# Patient Record
Sex: MALE | Race: OTHER | NOT HISPANIC OR LATINO
[De-identification: names, ages, dates, MRNs, and addresses within clinical notes are randomized per-mention and may not be internally consistent; named-entity substitution may affect disease eponyms.]

---

## 2018-12-10 ENCOUNTER — TRANSCRIPTION ENCOUNTER (OUTPATIENT)
Age: 38
End: 2018-12-10

## 2019-01-09 ENCOUNTER — TRANSCRIPTION ENCOUNTER (OUTPATIENT)
Age: 39
End: 2019-01-09

## 2019-06-11 ENCOUNTER — TRANSCRIPTION ENCOUNTER (OUTPATIENT)
Age: 39
End: 2019-06-11

## 2019-06-13 ENCOUNTER — APPOINTMENT (OUTPATIENT)
Dept: ORTHOPEDIC SURGERY | Facility: CLINIC | Age: 39
End: 2019-06-13
Payer: MEDICAID

## 2019-06-13 VITALS
BODY MASS INDEX: 27.2 KG/M2 | DIASTOLIC BLOOD PRESSURE: 74 MMHG | SYSTOLIC BLOOD PRESSURE: 132 MMHG | HEART RATE: 91 BPM | HEIGHT: 70 IN | WEIGHT: 190 LBS

## 2019-06-13 DIAGNOSIS — F17.200 NICOTINE DEPENDENCE, UNSPECIFIED, UNCOMPLICATED: ICD-10-CM

## 2019-06-13 DIAGNOSIS — Z78.9 OTHER SPECIFIED HEALTH STATUS: ICD-10-CM

## 2019-06-13 DIAGNOSIS — Z72.3 LACK OF PHYSICAL EXERCISE: ICD-10-CM

## 2019-06-13 PROCEDURE — 99203 OFFICE O/P NEW LOW 30 MIN: CPT

## 2019-06-14 PROBLEM — F17.200 CURRENT EVERY DAY SMOKER: Status: ACTIVE | Noted: 2019-06-13

## 2019-06-14 PROBLEM — Z78.9 NO PERTINENT PAST MEDICAL HISTORY: Status: RESOLVED | Noted: 2019-06-13 | Resolved: 2019-06-14

## 2019-06-14 PROBLEM — Z72.3 DOES NOT EXERCISE: Status: ACTIVE | Noted: 2019-06-13

## 2019-06-14 PROBLEM — Z78.9 DENIES ALCOHOL CONSUMPTION: Status: ACTIVE | Noted: 2019-06-13

## 2019-06-18 ENCOUNTER — EMERGENCY (EMERGENCY)
Facility: HOSPITAL | Age: 39
LOS: 0 days | Discharge: HOME | End: 2019-06-19
Attending: EMERGENCY MEDICINE | Admitting: EMERGENCY MEDICINE
Payer: MEDICAID

## 2019-06-18 VITALS — HEIGHT: 70 IN | WEIGHT: 190.04 LBS

## 2019-06-18 DIAGNOSIS — F11.10 OPIOID ABUSE, UNCOMPLICATED: ICD-10-CM

## 2019-06-18 DIAGNOSIS — R45.851 SUICIDAL IDEATIONS: ICD-10-CM

## 2019-06-18 LAB
APPEARANCE UR: CLEAR — SIGNIFICANT CHANGE UP
BILIRUB UR-MCNC: NEGATIVE — SIGNIFICANT CHANGE UP
COLOR SPEC: YELLOW — SIGNIFICANT CHANGE UP
DIFF PNL FLD: ABNORMAL
GLUCOSE UR QL: NEGATIVE MG/DL — SIGNIFICANT CHANGE UP
KETONES UR-MCNC: ABNORMAL
LEUKOCYTE ESTERASE UR-ACNC: NEGATIVE — SIGNIFICANT CHANGE UP
NITRITE UR-MCNC: NEGATIVE — SIGNIFICANT CHANGE UP
PCP SPEC-MCNC: SIGNIFICANT CHANGE UP
PH UR: 5 — SIGNIFICANT CHANGE UP (ref 5–8)
PROT UR-MCNC: 15 MG/DL
SP GR SPEC: 1.02 — SIGNIFICANT CHANGE UP (ref 1.01–1.02)
UROBILINOGEN FLD QL: NEGATIVE MG/DL — SIGNIFICANT CHANGE UP

## 2019-06-18 PROCEDURE — 99284 EMERGENCY DEPT VISIT MOD MDM: CPT

## 2019-06-18 PROCEDURE — 90792 PSYCH DIAG EVAL W/MED SRVCS: CPT | Mod: GT

## 2019-06-18 NOTE — ED ADULT NURSE NOTE - OBJECTIVE STATEMENT
Pt. brought in by parents for eval of suicidal statements, As per pt. denies SI/HI, admits to using heroin today and daily cigarettes. Pt. states he has been more withdrawn because he is told he is too loud. Pt. states he is unemployed but enjoys time with his 3 month old puppy. As per parents, pt. had hx of oxycodone abuse but never admitted it to his family. Mother showed RN picture on her phone of pt. today when she got home, picture showed pt. in a disorganized room where pt. was laying on ground, supine with head under nightstand with shoes on and sweatshirt pulled over his head. Pt. father states pt. has been more lethargic since after father's day and has been forgetful. When pt. is approached by parents about lethargy he states "I'm just tired." Pt. brought in by parents for eval of suicidal statements, As per pt. denies SI/HI, admits to using heroin today and daily cigarettes. Pt. states he has been more withdrawn because he is told he is too loud. Pt. states he is unemployed but enjoys time with his 3 month old puppy. As per parents, pt. had hx of oxycodone abuse but never admitted it to his family. Mother showed RN picture on her phone of pt. today when she got home, picture showed pt. in a disorganized room where pt. was laying on ground, supine with head under nightstand with shoes on and sweatshirt pulled over his head. Pt. father states pt. has been more lethargic since after father's day and has been forgetful. When pt. is approached by parents about lethargy he states "I'm just tired." As per father pt. stated today "I just want everything to end and I want to die." Pt. denies SI/Hi to nurse, safety maintained, constant observation intact.

## 2019-06-18 NOTE — ED ADULT TRIAGE NOTE - CHIEF COMPLAINT QUOTE
Pts' father reports pt has been depressed and " out of it" for the past 2 days with little interest in activities . Father reports pt stated he is "looking forward to the end" today which prompted this ER visit. Pt admits to being depressed for the several days, denies SI/HI.

## 2019-06-19 VITALS
RESPIRATION RATE: 18 BRPM | OXYGEN SATURATION: 98 % | DIASTOLIC BLOOD PRESSURE: 92 MMHG | TEMPERATURE: 98 F | SYSTOLIC BLOOD PRESSURE: 121 MMHG | HEART RATE: 91 BPM

## 2019-06-19 DIAGNOSIS — F11.10 OPIOID ABUSE, UNCOMPLICATED: ICD-10-CM

## 2019-06-19 LAB
ALBUMIN SERPL ELPH-MCNC: 3.5 G/DL — SIGNIFICANT CHANGE UP (ref 3.3–5)
ALP SERPL-CCNC: 96 U/L — SIGNIFICANT CHANGE UP (ref 40–120)
ALT FLD-CCNC: 23 U/L — SIGNIFICANT CHANGE UP (ref 12–78)
AMPHET UR-MCNC: NEGATIVE — SIGNIFICANT CHANGE UP
ANION GAP SERPL CALC-SCNC: 8 MMOL/L — SIGNIFICANT CHANGE UP (ref 5–17)
APAP SERPL-MCNC: < 2 UG/ML (ref 10–30)
AST SERPL-CCNC: 17 U/L — SIGNIFICANT CHANGE UP (ref 15–37)
BACTERIA # UR AUTO: NEGATIVE — SIGNIFICANT CHANGE UP
BARBITURATES UR SCN-MCNC: NEGATIVE — SIGNIFICANT CHANGE UP
BASOPHILS # BLD AUTO: 0 K/UL — SIGNIFICANT CHANGE UP (ref 0–0.2)
BASOPHILS NFR BLD AUTO: 0 % — SIGNIFICANT CHANGE UP (ref 0–2)
BENZODIAZ UR-MCNC: POSITIVE — SIGNIFICANT CHANGE UP
BILIRUB SERPL-MCNC: 0.4 MG/DL — SIGNIFICANT CHANGE UP (ref 0.2–1.2)
BUN SERPL-MCNC: 21 MG/DL — SIGNIFICANT CHANGE UP (ref 7–23)
CALCIUM SERPL-MCNC: 8.8 MG/DL — SIGNIFICANT CHANGE UP (ref 8.5–10.1)
CHLORIDE SERPL-SCNC: 107 MMOL/L — SIGNIFICANT CHANGE UP (ref 96–108)
CO2 SERPL-SCNC: 26 MMOL/L — SIGNIFICANT CHANGE UP (ref 22–31)
COCAINE METAB.OTHER UR-MCNC: NEGATIVE — SIGNIFICANT CHANGE UP
CREAT SERPL-MCNC: 1.34 MG/DL — HIGH (ref 0.5–1.3)
EOSINOPHIL # BLD AUTO: 0.29 K/UL — SIGNIFICANT CHANGE UP (ref 0–0.5)
EOSINOPHIL NFR BLD AUTO: 2 % — SIGNIFICANT CHANGE UP (ref 0–6)
EPI CELLS # UR: NEGATIVE — SIGNIFICANT CHANGE UP
ETHANOL SERPL-MCNC: <10 MG/DL — SIGNIFICANT CHANGE UP (ref 0–10)
GLUCOSE SERPL-MCNC: 136 MG/DL — HIGH (ref 70–99)
HCT VFR BLD CALC: 45.2 % — SIGNIFICANT CHANGE UP (ref 39–50)
HGB BLD-MCNC: 15 G/DL — SIGNIFICANT CHANGE UP (ref 13–17)
LYMPHOCYTES # BLD AUTO: 29 % — SIGNIFICANT CHANGE UP (ref 13–44)
LYMPHOCYTES # BLD AUTO: 4.22 K/UL — HIGH (ref 1–3.3)
MANUAL SMEAR VERIFICATION: SIGNIFICANT CHANGE UP
MCHC RBC-ENTMCNC: 28.7 PG — SIGNIFICANT CHANGE UP (ref 27–34)
MCHC RBC-ENTMCNC: 33.2 GM/DL — SIGNIFICANT CHANGE UP (ref 32–36)
MCV RBC AUTO: 86.6 FL — SIGNIFICANT CHANGE UP (ref 80–100)
METHADONE UR-MCNC: NEGATIVE — SIGNIFICANT CHANGE UP
MONOCYTES # BLD AUTO: 0.29 K/UL — SIGNIFICANT CHANGE UP (ref 0–0.9)
MONOCYTES NFR BLD AUTO: 2 % — SIGNIFICANT CHANGE UP (ref 2–14)
NEUTROPHILS # BLD AUTO: 9.76 K/UL — HIGH (ref 1.8–7.4)
NEUTROPHILS NFR BLD AUTO: 67 % — SIGNIFICANT CHANGE UP (ref 43–77)
NRBC # BLD: 0 /100 — SIGNIFICANT CHANGE UP (ref 0–0)
NRBC # BLD: SIGNIFICANT CHANGE UP /100 WBCS (ref 0–0)
OPIATES UR-MCNC: POSITIVE — SIGNIFICANT CHANGE UP
PCP UR-MCNC: NEGATIVE — SIGNIFICANT CHANGE UP
PLAT MORPH BLD: NORMAL — SIGNIFICANT CHANGE UP
PLATELET # BLD AUTO: 297 K/UL — SIGNIFICANT CHANGE UP (ref 150–400)
POTASSIUM SERPL-MCNC: 4 MMOL/L — SIGNIFICANT CHANGE UP (ref 3.5–5.3)
POTASSIUM SERPL-SCNC: 4 MMOL/L — SIGNIFICANT CHANGE UP (ref 3.5–5.3)
PROT SERPL-MCNC: 6.8 GM/DL — SIGNIFICANT CHANGE UP (ref 6–8.3)
RBC # BLD: 5.22 M/UL — SIGNIFICANT CHANGE UP (ref 4.2–5.8)
RBC # FLD: 13.3 % — SIGNIFICANT CHANGE UP (ref 10.3–14.5)
RBC BLD AUTO: NORMAL — SIGNIFICANT CHANGE UP
RBC CASTS # UR COMP ASSIST: ABNORMAL /HPF (ref 0–4)
SALICYLATES SERPL-MCNC: 2.8 MG/DL — SIGNIFICANT CHANGE UP (ref 2.8–20)
SODIUM SERPL-SCNC: 141 MMOL/L — SIGNIFICANT CHANGE UP (ref 135–145)
THC UR QL: NEGATIVE — SIGNIFICANT CHANGE UP
TSH SERPL-MCNC: 1.73 UU/ML — SIGNIFICANT CHANGE UP (ref 0.34–4.82)
WBC # BLD: 14.56 K/UL — HIGH (ref 3.8–10.5)
WBC # FLD AUTO: 14.56 K/UL — HIGH (ref 3.8–10.5)
WBC UR QL: SIGNIFICANT CHANGE UP

## 2019-06-19 NOTE — ED PROVIDER NOTE - PROGRESS NOTE DETAILS
labs back etoh negative telepsych called case dw psychiatrist negro Buchanan DO spoke with telepsych pt can be d/c MINO Buchanan DO

## 2019-06-19 NOTE — ED BEHAVIORAL HEALTH ASSESSMENT NOTE - SUMMARY
Patient is a 38 year old single unemployed  male domiciled with mother with no formal psychiatric history, no past hospitalizations, no past suicide attempts, no past violence but has destroyed property a few times, past arrests for drug related issues, past substance abuse history opiate, cannabis and alcohol abuse, no known medical issues patient arrived via walk-in accompanied by parents due to suicidal statements in the context of what parents believe to be a depressive state over the past few days.   urine toxicology was positive for benzo and opiates. pt also admitted to heroin use today to nurse which he later denied to writer. pt is not disclosing his substance abuse to writer or his family. opiate and benzo use would explain behaviors reported by family- pt passed out on the floor, lethargic, confused, and withdrawn. the pt may have an underlying mood disorder as he made depressive statements and did share that he carries some resentment toward father, however the pt also said that he specifically made statements to upset his father. pt has also been unemployed for a year and parents appears to be reinforcing this behavior.     at this time the pt is not manic, depressive, overly depressed or anxious. the pt is not at imminent risk for suicide or homicide, is able to care for self, and is therefore not meeting criteria for involuntary psychiatric hospitalization. pt wants to go home. rehab is recommended however pt is declining at this time.

## 2019-06-19 NOTE — ED PROVIDER NOTE - CONSTITUTIONAL, MLM
normal... disheveled, well nourished, awake, alert, oriented to person, place, time/situation and in no apparent distress.

## 2019-06-19 NOTE — ED BEHAVIORAL HEALTH ASSESSMENT NOTE - REFERRAL / APPOINTMENT DETAILS
faxed over substance abuse referral information and dual diagnosis substance and mental health referral information

## 2019-06-19 NOTE — ED BEHAVIORAL HEALTH ASSESSMENT NOTE - DESCRIPTION (FIRST USE, LAST USE, QUANTITY, FREQUENCY, DURATION)
pt denies however parents reports a h/o use with 2yrs sobriety as per pt not currently. years ago was arrested for possession pt providing inconsistent information. was in detox years ago for oxycodone abuse. reports that he used heroin today but then denied. pt denies but has +urine tox

## 2019-06-19 NOTE — ED PROVIDER NOTE - OBJECTIVE STATEMENT
39 yo male bib father after making a comment that today was the end. Pt admits he ahs been in a "bad mood" for the last 2 days but denies si or hi. Pt has a ho opiate addiction 6 years ago, has "low self esteem" as per father, lives at home and is unable to work.

## 2019-06-19 NOTE — ED BEHAVIORAL HEALTH ASSESSMENT NOTE - SUICIDE PROTECTIVE FACTORS
Responsibility to family and others/Identifies reasons for living/Future oriented/Fear of death or dying due to pain/suffering

## 2019-06-19 NOTE — ED ADULT NURSE REASSESSMENT NOTE - NS ED NURSE REASSESS COMMENT FT1
Pt evaluated by Telepsych. pt comfortable at present, no agitation noted. Denies making suicidal ideations

## 2019-06-19 NOTE — ED BEHAVIORAL HEALTH ASSESSMENT NOTE - OTHER PAST PSYCHIATRIC HISTORY (INCLUDE DETAILS REGARDING ONSET, COURSE OF ILLNESS, INPATIENT/OUTPATIENT TREATMENT)
saw a psychiatrist briefly at a young age, ? trial of ritalin or zoloft, neither pt or father recall details. pt said that they suddenly stopped going to appointments. no h/o psychiatric admissions, SA and not in outpatient tx

## 2019-06-19 NOTE — ED BEHAVIORAL HEALTH ASSESSMENT NOTE - HPI (INCLUDE ILLNESS QUALITY, SEVERITY, DURATION, TIMING, CONTEXT, MODIFYING FACTORS, ASSOCIATED SIGNS AND SYMPTOMS)
Patient is a 38 year old single unemployed  male domiciled with mother with no formal psychiatric history, no past hospitalizations, no past suicide attempts, no past violence but has destroyed property a few times, past arrests for drug related issues, past substance abuse history opiate, cannabis and alcohol abuse, no known medical issues patient arrived via walk-in accompanied by parents due to suicidal statements in the context of what parents believe to be a depressive state over the past few days. urine toxicology was positive for benzo and opiates.    the pt provided inconsistent hx to writer vs. ER nurse. as per collateral (see below for more details), over the past few days the pt has been "out of it," lethargic and confused. mother showed  the ER nurse a picture of the pt passed out on the floor. pt has also been making passive suicidal statements about "looking forward to the end." pt admitted to RN that he had used heroin today, however family is unaware of current substance use he told parents in the ER that he "just tired."    the pt was pleasant during telepsych interview. he states that he does not need to be in the ER and his father is "over-reacting." he states that his father has been intermittently present in his life since 6yo. pt resents his father because father "does things with my brothers" and spends more time with them. they were all gathered for father's day and the pt reports spending a lot of time on setting up/cleaning up, no one helped him and he felt tired. he admits to making depressive statements to intentionally get under his father's skin. he denies any suicidal ideation, intent or plan. he denies any depressed mood although there were notes in the chart that he had said he was depressed. he only admits to chronically poor sleep (watches TV all night) but reports that he has enough energy during the day. appetite is good. he has not worked in over 1 year and states that he keeps busy at home maintaining the house while mother works and playing with his new puppy. 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 he denies any anxiety symptoms., he denies any HI or aggressive thoughts. denied symptoms of chucho including decreased need for sleep, increased goal directed activity, grandiosity, or racing thoughts. no psychotic symptoms of avh or paranoid delusions.     pt denied any recent substance abuse. when confronted with his statements to the RN about taking heroin today, he denied this and states that he "rarely" uses and last used a few years ago. when asked about passing out in this room he joked about his room being messy. he was confronted about his utox being positive for benzo and opiates. he asked what a benzo was and then once again denied any substance use. when asked whether he'd be interested in substance abuse referrals he said, "sure why not." of note, the pt did report a questionable hx of seeing a psychiatrist in the past an "manipulating them" and trying to get benefits.

## 2019-06-19 NOTE — ED BEHAVIORAL HEALTH ASSESSMENT NOTE - RISK ASSESSMENT
pt's h/o substance abuse and male gender as well a h/o property destruction put him at a chronically elevated risk as compared to the general population. active use, being unemployed and not in tx also contribute to acute risk factors. however the pt has no h/o psychiatric admissions, no h/o SA or sib, is denying any si/i/p, has no h/o violence toward others and is denying hi, no access to guns, is not manic or psychotic.   Acute Suicide Risk  (  ) High   (  ) Moderate   ( x ) Low   (  ) Unable to determine   Rationale ___________see above     Elevated Chronic Risk   ( x ) Yes ___________  Details ___________see above  (  ) No   ___________     Safety Plan   Details: ___________on gun in the house, given referral information, stated that he would call 911 or return to the ER for any SI or worsening mood  [x  ] Safety plan discussed with patient  [x  ] Education provided regarding environmental safety / lethal means restriction  [ x ] Provision of National Suicide Prevention Lifeline 7-255-301-TALK (1249)

## 2019-06-19 NOTE — ED BEHAVIORAL HEALTH ASSESSMENT NOTE - OTHER
no data: [facility], [facility], [facility] ED, [facility] Inpatient, [facility] CL, [facility] Outpatient Medical, [facility] Outpatient BH, H[facility] ED, [facility] Inpatient, [facility] Outpatient, [facility] Inpatient, Tier E&A, [facility] Inpatient, Quick Docs, [facility], Scan ER, [facility] Inpatient, [facility] CL, Social Media (For example - Facebook, [email or web address], Echo it), Web search, [facility], external billing deferred to medical ER

## 2019-06-19 NOTE — ED BEHAVIORAL HEALTH ASSESSMENT NOTE - DESCRIPTION
VITAL SIGNS (Last 24 hrs):  T(C): 36.8 (06-19-19 @ 01:41), Max: 37.2 (06-18-19 @ 22:32)  HR: 78 (06-19-19 @ 01:41) (78 - 100)  BP: 114/83 (06-19-19 @ 01:41) (114/83 - 135/92)  RR: 18 (06-19-19 @ 01:41) (17 - 18)  SpO2: 96% (06-19-19 @ 01:41) (96% - 100%)  Wt(kg): --  Daily Height in cm: 177.8 (18 Jun 2019 21:49)    Pre-Hospital Course: Patient arrived via walk-in accompanied by parents due to suicidal statements in the context of a depressive state over the past few days.     ED Course:  Patient arrived to the ED approximately  hours prior to consultation. Per ED RN and documentation patient arrived alert and oriented x3, however patient was noted to be lethargic. Patient is cooperative with ED medical and safety protocols. Patient denied mood symptoms, his affect is depressed, patient denied suicidal or homicidal thoughts. Patient did not report or exhibit symptoms of psychosis or chucho. Patient had linear thought process and normal speech. Patient remained in good behavioral control while in the ED, did not require PRN psychiatric medication. Patient was able to sleep while in the ED. Patient’s parents are at bedside and appear supportive.     Collateral Sources: Patient’s father Derek Simms ([phone])  HPI: Per collateral the HPI begins about 2 days ago. 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 Patient was never , no children. Patient lost his friends due to opiate abuse in past, spends his time at home helping around the house and reportedly looking for a job. Patient is described as an emotional person who can be argumentative and distant at times. Patient has no history of physical violence but can destroy property in past. Patient is in no outpatient substance or psychiatric care. Patient briefly saw a psychiatrist in his teens and may have been prescribed Ritalin but father is not sure. Patient has a history of oxycodone abuse per father, patient did a detox around 6 years ago but otherwise no other treatment, family was under the impression patient has been clean from opiates. Patient also used to abuse Etoh but reports to family sobriety for 2 years. Patient has had difficulty sleeping for several years now. No known withdrawal seizures or significant withdrawal complications.    Collateral states patient was in usual state on Father’s day but 2 days ago received a call from patient’s brother indicating patient seemed confused and incoherent standing in front of the coffee pot for a long time but patient denied any issues. Collateral received another call today that patient was in a similar state and found passed out on floor of his room, collateral states patient was slurring his words heavily and suspects patient may have been under the influence of something. Collateral reports patient got frustrated and stated that he has nothing to look forward to and is looking forward to the end of this but did not detail a plan or intent to hurt self. Collateral denies patient reported or exhibited symptoms of AH/VH or chucho. Collateral denies patient threatened others or became violent. Collateral is unsure if patient used any substances. Collateral is concerned about patient’s behavior, reports patient has not voiced SI like this in the past. Collateral states they are unsure whether this is drug related or more psychiatric, feels patient would benefit from either inpatient psychiatry or rehab for stabilization. none acute lives with his mother and brother, unemployed for the past 1 yr as a , never  and no children; has a 3mo puppy

## 2019-06-26 NOTE — HISTORY OF PRESENT ILLNESS
[6] : the relief from medicine is 6/10 [2] : the ailment interference is 2/10 [8] : the ailment interference is 8/10 [1] : the ailment interference is 1/10 [de-identified] : The patient comes in today with complaints of pain to his left shoulder.  He states he was seen, evaluated for shoulder pain and was feeling much better.  He states it had gone on for many years, but got worse over the last couple of weeks.  The patient states the onset/injury occurred on 06/10/19.  This injury is not work related or due to an automobile accident.  The patient states the pain is sharp and burning.  The patient describes the pain as constant.\par \par  [de-identified] : No use of shoulder. [de-identified] : Methylpredniisolone [] : No

## 2019-06-26 NOTE — REVIEW OF SYSTEMS
[Joint Pain] : joint pain [Arthralgia] : arthralgia [Sleep Disturbances] : ~T sleep disturbances [Muscle Weakness] : muscle weakness [Negative] : Heme/Lymph

## 2019-06-26 NOTE — DISCUSSION/SUMMARY
[de-identified] : At this time, due to resolving impingement syndrome of the left shoulder, he will finish his Medrol Dosepak and use ice and anti-inflammatory.  He will be reassessed in 3 weeks.  Should his symptoms warrant, we will discuss the potential for a subacromial injection.\par

## 2019-06-26 NOTE — ADDENDUM
[FreeTextEntry1] : This note was written by Michele Rios on 06/18/2019, acting as a scribe for Rodrigo Rhoades III, MD

## 2019-06-26 NOTE — PHYSICAL EXAM
[Normal] : Gait: normal [de-identified] : Appearance:  Well developed, well-nourished male in no acute distress.\par   [de-identified] : Right Shoulder: \par Shoulder: Range of Motion in Degrees:   	\par    	                        Claimant:          Normal:  	\par Abduction (Active)  	180  	180 degrees  	\par Abduction (Passive)  	180  	180 degrees  	\par Forward elevation (Active):  	180  	180 degrees  	\par Forward elevation (Passive):  180  	180 degrees  	\par External rotation (Active):  	45  	45 degrees  	\par External rotation (Passive):  	45  	45 degrees  	\par Internal rotation (Active):  	L-1  	L-1  	\par Internal rotation (Passive):  	L-1  	L-1  \par 	\par No motor weakness to internal rotation, external rotation or abduction in the scapular plane.  Negative crank test.  Negative O’Cy’s test.  Negative Speed’s test. Negative Yergason’s test.  Negative cross arm test.  No tenderness to palpation at the AC joint. Negative Hawkin’s sign.  Negative Neer’s sign.  Negative apprehension. Negative sulcus sign.  No gross neurological or vascular deficits distally.  Skin is intact.  No rashes, scars or lesions.  2+ radial and ulnar pulses. No extra-articular swelling or tenderness. \par \par Left Shoulder:  \par Shoulder: Range of Motion in Degrees:	\par 	                                  Claimant:	Normal:	\par Abduction (Active)	                  180	               180 degrees	\par Abduction (Passive)	  180	               180 degrees	\par Forward elevation (Active):	  180	               180 degrees	\par Forward elevation (Passive):	  180	               180 degrees	\par External rotation (Active):	   45	               45 degrees	\par External rotation (Passive):	   45	               45 degrees	\par Internal rotation (Active):	   L-1	               L-1	\par Internal rotation (Passive):	   L-1	               L-1	\par  \par No motor weakness to internal rotation, external rotation or abduction in the scapular plane.  Negative crank test.  Negative O’Cy’s test.  Negative Speed’s test. Negative Yergason’s test.  Negative cross arm test.  No tenderness to palpation at the AC joint. Positive Hawkin’s sign.  Positive Neer’s sign. Negative apprehension. Negative sulcus sign.  No gross neurological or vascular deficits distally.  Skin is intact.  No rashes, scars or lesions. 2+ radial and ulnar pulses. No extra-articular swelling or tenderness.\par

## 2019-07-09 ENCOUNTER — APPOINTMENT (OUTPATIENT)
Dept: ORTHOPEDIC SURGERY | Facility: CLINIC | Age: 39
End: 2019-07-09

## 2019-08-12 PROBLEM — Z78.9 OTHER SPECIFIED HEALTH STATUS: Chronic | Status: ACTIVE | Noted: 2019-06-19

## 2019-08-13 ENCOUNTER — TRANSCRIPTION ENCOUNTER (OUTPATIENT)
Age: 39
End: 2019-08-13

## 2019-08-22 ENCOUNTER — APPOINTMENT (OUTPATIENT)
Dept: FAMILY MEDICINE | Facility: CLINIC | Age: 39
End: 2019-08-22
Payer: MEDICAID

## 2019-08-22 VITALS
SYSTOLIC BLOOD PRESSURE: 118 MMHG | WEIGHT: 207 LBS | RESPIRATION RATE: 16 BRPM | OXYGEN SATURATION: 98 % | HEIGHT: 70 IN | HEART RATE: 90 BPM | BODY MASS INDEX: 29.63 KG/M2 | DIASTOLIC BLOOD PRESSURE: 80 MMHG

## 2019-08-22 DIAGNOSIS — R00.0 TACHYCARDIA, UNSPECIFIED: ICD-10-CM

## 2019-08-22 DIAGNOSIS — Z87.898 PERSONAL HISTORY OF OTHER SPECIFIED CONDITIONS: ICD-10-CM

## 2019-08-22 DIAGNOSIS — Z00.00 ENCOUNTER FOR GENERAL ADULT MEDICAL EXAMINATION W/OUT ABNORMAL FINDINGS: ICD-10-CM

## 2019-08-22 DIAGNOSIS — F32.9 MAJOR DEPRESSIVE DISORDER, SINGLE EPISODE, UNSPECIFIED: ICD-10-CM

## 2019-08-22 DIAGNOSIS — Z82.49 FAMILY HISTORY OF ISCHEMIC HEART DISEASE AND OTHER DISEASES OF THE CIRCULATORY SYSTEM: ICD-10-CM

## 2019-08-22 DIAGNOSIS — F11.20 OPIOID DEPENDENCE, UNCOMPLICATED: ICD-10-CM

## 2019-08-22 DIAGNOSIS — I10 ESSENTIAL (PRIMARY) HYPERTENSION: ICD-10-CM

## 2019-08-22 PROCEDURE — 99385 PREV VISIT NEW AGE 18-39: CPT | Mod: 25

## 2019-08-22 PROCEDURE — G0442 ANNUAL ALCOHOL SCREEN 15 MIN: CPT

## 2019-08-22 PROCEDURE — G0444 DEPRESSION SCREEN ANNUAL: CPT

## 2019-08-22 RX ORDER — TRAZODONE HYDROCHLORIDE 150 MG/1
150 TABLET ORAL
Refills: 0 | Status: ACTIVE | COMMUNITY

## 2019-08-22 RX ORDER — SERTRALINE HYDROCHLORIDE 50 MG/1
50 TABLET, FILM COATED ORAL
Refills: 0 | Status: ACTIVE | COMMUNITY

## 2019-08-22 RX ORDER — LABETALOL HYDROCHLORIDE 100 MG/1
100 TABLET, FILM COATED ORAL
Qty: 180 | Refills: 3 | Status: ACTIVE | COMMUNITY
Start: 1900-01-01 | End: 1900-01-01

## 2019-08-22 NOTE — HISTORY OF PRESENT ILLNESS
[FreeTextEntry1] : establish care/ annual [de-identified] : 39 yr old male is here to establish care.\par  Heroin use was in rehab in July. [facility] in [place].\par He reports he checked himself in the Psych unit was there for 15 days.\par There was told he has depression, but he does not feel like he is depressed.\par In school he was seen by Psychiatrist because he was  " had a mouth on me". Was on medication for a few days saw a second Psychiatrist who told him that he does not need medications.

## 2019-08-22 NOTE — HEALTH RISK ASSESSMENT
[Very Good] : ~his/her~  mood as very good [] : Yes [Yes] : In the past 12 months have you used drugs other than those required for medical reasons? Yes [No] : No [0] : 2) Feeling down, depressed, or hopeless: Not at all (0) [de-identified] : was taking heroine was abusing prescription drugs prior to that [Audit-CScore] : 0 [Change in mental status noted] : No change in mental status noted [Language] : denies difficulty with language [None] : None [With Family] : lives with family [Single] : single [Unemployed] : unemployed [Reports changes in dental health] : Reports no changes in dental health [Smoke Detector] : smoke detector [Carbon Monoxide Detector] : carbon monoxide detector [Seat Belt] :  uses seat belt [TB Exposure] : is not being exposed to tuberculosis [HIVDate] : 7/2019 [HepatitisCDate] : 7/2019

## 2019-08-22 NOTE — COUNSELING
[Risk of tobacco use and health benefits of smoking cessation discussed] : Risk of tobacco use and health benefits of smoking cessation discussed [Willing to Quit Smoking] : Not willing to quit smoking

## 2019-08-22 NOTE — PHYSICAL EXAM
[No Acute Distress] : no acute distress [Well Nourished] : well nourished [Well Developed] : well developed [Well-Appearing] : well-appearing [Normal Sclera/Conjunctiva] : normal sclera/conjunctiva [PERRL] : pupils equal round and reactive to light [EOMI] : extraocular movements intact [Normal Outer Ear/Nose] : the outer ears and nose were normal in appearance [Normal Oropharynx] : the oropharynx was normal [No JVD] : no jugular venous distention [No Lymphadenopathy] : no lymphadenopathy [Supple] : supple [No Respiratory Distress] : no respiratory distress  [No Accessory Muscle Use] : no accessory muscle use [Clear to Auscultation] : lungs were clear to auscultation bilaterally [Normal Rate] : normal rate  [Regular Rhythm] : with a regular rhythm [Normal S1, S2] : normal S1 and S2 [No Carotid Bruits] : no carotid bruits [No Murmur] : no murmur heard [No Abdominal Bruit] : a ~M bruit was not heard ~T in the abdomen [No Varicosities] : no varicosities [Pedal Pulses Present] : the pedal pulses are present [No Edema] : there was no peripheral edema [No Palpable Aorta] : no palpable aorta [No Extremity Clubbing/Cyanosis] : no extremity clubbing/cyanosis [Soft] : abdomen soft [Non Tender] : non-tender [Non-distended] : non-distended [No HSM] : no HSM [No Masses] : no abdominal mass palpated [Normal Bowel Sounds] : normal bowel sounds [Normal Posterior Cervical Nodes] : no posterior cervical lymphadenopathy [Normal Anterior Cervical Nodes] : no anterior cervical lymphadenopathy [No CVA Tenderness] : no CVA  tenderness [No Spinal Tenderness] : no spinal tenderness [No Joint Swelling] : no joint swelling [Grossly Normal Strength/Tone] : grossly normal strength/tone [No Rash] : no rash [Coordination Grossly Intact] : coordination grossly intact [No Focal Deficits] : no focal deficits [Normal Gait] : normal gait [Deep Tendon Reflexes (DTR)] : deep tendon reflexes were 2+ and symmetric [Normal Affect] : the affect was normal [Normal Insight/Judgement] : insight and judgment were intact

## 2019-08-22 NOTE — ASSESSMENT
[FreeTextEntry1] : Heroine addiction\par Inpatient program for 15 days \par Now outpatient 5 days a week fr 3 hours to be continued for 6 months\par Stay on Sertraline will wean off after 6 months. Patient reports he does not need it\par HTN: new diagnosis from July\par Rx renewed.\par Reports he lost weight not gained as noted on vitals. he was not weighted in june.\par Labs available from July for review, scanned in chart\par Tobacco Abuse not ready to quit.

## 2019-08-28 ENCOUNTER — TRANSCRIPTION ENCOUNTER (OUTPATIENT)
Age: 39
End: 2019-08-28

## 2019-11-01 ENCOUNTER — OUTPATIENT (OUTPATIENT)
Dept: OUTPATIENT SERVICES | Facility: HOSPITAL | Age: 39
LOS: 1 days | End: 2019-11-01
Payer: MEDICAID

## 2019-11-01 PROCEDURE — G9001: CPT

## 2019-11-05 DIAGNOSIS — Z71.89 OTHER SPECIFIED COUNSELING: ICD-10-CM

## 2020-03-11 ENCOUNTER — APPOINTMENT (OUTPATIENT)
Dept: FAMILY MEDICINE | Facility: CLINIC | Age: 40
End: 2020-03-11

## 2020-11-12 ENCOUNTER — APPOINTMENT (OUTPATIENT)
Dept: FAMILY MEDICINE | Facility: CLINIC | Age: 40
End: 2020-11-12

## 2020-11-25 ENCOUNTER — NON-APPOINTMENT (OUTPATIENT)
Age: 40
End: 2020-11-25

## 2021-03-24 ENCOUNTER — TRANSCRIPTION ENCOUNTER (OUTPATIENT)
Age: 41
End: 2021-03-24

## 2021-11-14 ENCOUNTER — INPATIENT (INPATIENT)
Facility: HOSPITAL | Age: 41
LOS: 1 days | Discharge: HOME | DRG: 263 | End: 2021-11-16
Attending: SURGERY | Admitting: SURGERY
Payer: MEDICAID

## 2021-11-14 ENCOUNTER — TRANSCRIPTION ENCOUNTER (OUTPATIENT)
Age: 41
End: 2021-11-14

## 2021-11-14 VITALS — WEIGHT: 225.09 LBS | HEIGHT: 70 IN

## 2021-11-14 DIAGNOSIS — K81.0 ACUTE CHOLECYSTITIS: ICD-10-CM

## 2021-11-14 LAB
ABO RH CONFIRMATION: SIGNIFICANT CHANGE UP
ALBUMIN SERPL ELPH-MCNC: 3.2 G/DL — LOW (ref 3.3–5)
ALP SERPL-CCNC: 87 U/L — SIGNIFICANT CHANGE UP (ref 40–120)
ALT FLD-CCNC: 42 U/L — SIGNIFICANT CHANGE UP (ref 12–78)
ANION GAP SERPL CALC-SCNC: 7 MMOL/L — SIGNIFICANT CHANGE UP (ref 5–17)
APPEARANCE UR: CLEAR — SIGNIFICANT CHANGE UP
APTT BLD: 34.3 SEC — SIGNIFICANT CHANGE UP (ref 27.5–35.5)
AST SERPL-CCNC: 16 U/L — SIGNIFICANT CHANGE UP (ref 15–37)
BASOPHILS # BLD AUTO: 0.05 K/UL — SIGNIFICANT CHANGE UP (ref 0–0.2)
BASOPHILS NFR BLD AUTO: 0.3 % — SIGNIFICANT CHANGE UP (ref 0–2)
BILIRUB SERPL-MCNC: 0.6 MG/DL — SIGNIFICANT CHANGE UP (ref 0.2–1.2)
BILIRUB UR-MCNC: NEGATIVE — SIGNIFICANT CHANGE UP
BUN SERPL-MCNC: 10 MG/DL — SIGNIFICANT CHANGE UP (ref 7–23)
CALCIUM SERPL-MCNC: 9.3 MG/DL — SIGNIFICANT CHANGE UP (ref 8.5–10.1)
CHLORIDE SERPL-SCNC: 103 MMOL/L — SIGNIFICANT CHANGE UP (ref 96–108)
CO2 SERPL-SCNC: 26 MMOL/L — SIGNIFICANT CHANGE UP (ref 22–31)
COLOR SPEC: YELLOW — SIGNIFICANT CHANGE UP
CREAT SERPL-MCNC: 1.13 MG/DL — SIGNIFICANT CHANGE UP (ref 0.5–1.3)
DIFF PNL FLD: ABNORMAL
EOSINOPHIL # BLD AUTO: 0.17 K/UL — SIGNIFICANT CHANGE UP (ref 0–0.5)
EOSINOPHIL NFR BLD AUTO: 1.2 % — SIGNIFICANT CHANGE UP (ref 0–6)
GLUCOSE SERPL-MCNC: 114 MG/DL — HIGH (ref 70–99)
GLUCOSE UR QL: NEGATIVE MG/DL — SIGNIFICANT CHANGE UP
HCT VFR BLD CALC: 46.2 % — SIGNIFICANT CHANGE UP (ref 39–50)
HGB BLD-MCNC: 15.9 G/DL — SIGNIFICANT CHANGE UP (ref 13–17)
IMM GRANULOCYTES NFR BLD AUTO: 0.3 % — SIGNIFICANT CHANGE UP (ref 0–1.5)
INR BLD: 1.11 RATIO — SIGNIFICANT CHANGE UP (ref 0.88–1.16)
KETONES UR-MCNC: NEGATIVE — SIGNIFICANT CHANGE UP
LACTATE SERPL-SCNC: 1 MMOL/L — SIGNIFICANT CHANGE UP (ref 0.7–2)
LEUKOCYTE ESTERASE UR-ACNC: NEGATIVE — SIGNIFICANT CHANGE UP
LIDOCAIN IGE QN: 92 U/L — SIGNIFICANT CHANGE UP (ref 73–393)
LYMPHOCYTES # BLD AUTO: 15.9 % — SIGNIFICANT CHANGE UP (ref 13–44)
LYMPHOCYTES # BLD AUTO: 2.28 K/UL — SIGNIFICANT CHANGE UP (ref 1–3.3)
MCHC RBC-ENTMCNC: 30.8 PG — SIGNIFICANT CHANGE UP (ref 27–34)
MCHC RBC-ENTMCNC: 34.4 GM/DL — SIGNIFICANT CHANGE UP (ref 32–36)
MCV RBC AUTO: 89.4 FL — SIGNIFICANT CHANGE UP (ref 80–100)
MONOCYTES # BLD AUTO: 0.99 K/UL — HIGH (ref 0–0.9)
MONOCYTES NFR BLD AUTO: 6.9 % — SIGNIFICANT CHANGE UP (ref 2–14)
NEUTROPHILS # BLD AUTO: 10.83 K/UL — HIGH (ref 1.8–7.4)
NEUTROPHILS NFR BLD AUTO: 75.4 % — SIGNIFICANT CHANGE UP (ref 43–77)
NITRITE UR-MCNC: NEGATIVE — SIGNIFICANT CHANGE UP
PH UR: 6.5 — SIGNIFICANT CHANGE UP (ref 5–8)
PLATELET # BLD AUTO: 352 K/UL — SIGNIFICANT CHANGE UP (ref 150–400)
POTASSIUM SERPL-MCNC: 3.6 MMOL/L — SIGNIFICANT CHANGE UP (ref 3.5–5.3)
POTASSIUM SERPL-SCNC: 3.6 MMOL/L — SIGNIFICANT CHANGE UP (ref 3.5–5.3)
PROT SERPL-MCNC: 7.7 GM/DL — SIGNIFICANT CHANGE UP (ref 6–8.3)
PROT UR-MCNC: 30 MG/DL
PROTHROM AB SERPL-ACNC: 12.9 SEC — SIGNIFICANT CHANGE UP (ref 10.6–13.6)
RBC # BLD: 5.17 M/UL — SIGNIFICANT CHANGE UP (ref 4.2–5.8)
RBC # FLD: 13.3 % — SIGNIFICANT CHANGE UP (ref 10.3–14.5)
SODIUM SERPL-SCNC: 136 MMOL/L — SIGNIFICANT CHANGE UP (ref 135–145)
SP GR SPEC: 1.01 — SIGNIFICANT CHANGE UP (ref 1.01–1.02)
TROPONIN I, HIGH SENSITIVITY RESULT: 6.58 NG/L — SIGNIFICANT CHANGE UP
UROBILINOGEN FLD QL: NEGATIVE MG/DL — SIGNIFICANT CHANGE UP
WBC # BLD: 14.36 K/UL — HIGH (ref 3.8–10.5)
WBC # FLD AUTO: 14.36 K/UL — HIGH (ref 3.8–10.5)

## 2021-11-14 PROCEDURE — 80053 COMPREHEN METABOLIC PANEL: CPT

## 2021-11-14 PROCEDURE — 93010 ELECTROCARDIOGRAM REPORT: CPT

## 2021-11-14 PROCEDURE — C9399: CPT

## 2021-11-14 PROCEDURE — 76705 ECHO EXAM OF ABDOMEN: CPT | Mod: 26

## 2021-11-14 PROCEDURE — 99285 EMERGENCY DEPT VISIT HI MDM: CPT

## 2021-11-14 PROCEDURE — 71045 X-RAY EXAM CHEST 1 VIEW: CPT | Mod: 26

## 2021-11-14 PROCEDURE — 74177 CT ABD & PELVIS W/CONTRAST: CPT | Mod: 26,MA

## 2021-11-14 PROCEDURE — 83735 ASSAY OF MAGNESIUM: CPT

## 2021-11-14 PROCEDURE — 84100 ASSAY OF PHOSPHORUS: CPT

## 2021-11-14 PROCEDURE — 36415 COLL VENOUS BLD VENIPUNCTURE: CPT

## 2021-11-14 PROCEDURE — 88304 TISSUE EXAM BY PATHOLOGIST: CPT

## 2021-11-14 PROCEDURE — 85025 COMPLETE CBC W/AUTO DIFF WBC: CPT

## 2021-11-14 PROCEDURE — 86769 SARS-COV-2 COVID-19 ANTIBODY: CPT

## 2021-11-14 PROCEDURE — C1889: CPT

## 2021-11-14 PROCEDURE — 85027 COMPLETE CBC AUTOMATED: CPT

## 2021-11-14 RX ORDER — HEPARIN SODIUM 5000 [USP'U]/ML
5000 INJECTION INTRAVENOUS; SUBCUTANEOUS EVERY 8 HOURS
Refills: 0 | Status: DISCONTINUED | OUTPATIENT
Start: 2021-11-14 | End: 2021-11-16

## 2021-11-14 RX ORDER — SODIUM CHLORIDE 9 MG/ML
1000 INJECTION INTRAMUSCULAR; INTRAVENOUS; SUBCUTANEOUS ONCE
Refills: 0 | Status: COMPLETED | OUTPATIENT
Start: 2021-11-14 | End: 2021-11-14

## 2021-11-14 RX ORDER — KETOROLAC TROMETHAMINE 30 MG/ML
15 SYRINGE (ML) INJECTION EVERY 6 HOURS
Refills: 0 | Status: DISCONTINUED | OUTPATIENT
Start: 2021-11-14 | End: 2021-11-16

## 2021-11-14 RX ORDER — SODIUM CHLORIDE 9 MG/ML
1000 INJECTION, SOLUTION INTRAVENOUS
Refills: 0 | Status: DISCONTINUED | OUTPATIENT
Start: 2021-11-14 | End: 2021-11-16

## 2021-11-14 RX ORDER — CIPROFLOXACIN LACTATE 400MG/40ML
400 VIAL (ML) INTRAVENOUS EVERY 12 HOURS
Refills: 0 | Status: DISCONTINUED | OUTPATIENT
Start: 2021-11-14 | End: 2021-11-16

## 2021-11-14 RX ORDER — ONDANSETRON 8 MG/1
4 TABLET, FILM COATED ORAL EVERY 6 HOURS
Refills: 0 | Status: DISCONTINUED | OUTPATIENT
Start: 2021-11-14 | End: 2021-11-16

## 2021-11-14 RX ORDER — NICOTINE POLACRILEX 2 MG
1 GUM BUCCAL DAILY
Refills: 0 | Status: DISCONTINUED | OUTPATIENT
Start: 2021-11-14 | End: 2021-11-16

## 2021-11-14 RX ORDER — ACETAMINOPHEN 500 MG
1000 TABLET ORAL EVERY 6 HOURS
Refills: 0 | Status: DISCONTINUED | OUTPATIENT
Start: 2021-11-14 | End: 2021-11-16

## 2021-11-14 RX ORDER — MORPHINE SULFATE 50 MG/1
2 CAPSULE, EXTENDED RELEASE ORAL EVERY 6 HOURS
Refills: 0 | Status: DISCONTINUED | OUTPATIENT
Start: 2021-11-14 | End: 2021-11-16

## 2021-11-14 RX ORDER — KETOROLAC TROMETHAMINE 30 MG/ML
30 SYRINGE (ML) INJECTION ONCE
Refills: 0 | Status: DISCONTINUED | OUTPATIENT
Start: 2021-11-14 | End: 2021-11-14

## 2021-11-14 RX ORDER — METRONIDAZOLE 500 MG
500 TABLET ORAL EVERY 8 HOURS
Refills: 0 | Status: DISCONTINUED | OUTPATIENT
Start: 2021-11-14 | End: 2021-11-16

## 2021-11-14 RX ADMIN — SODIUM CHLORIDE 1000 MILLILITER(S): 9 INJECTION INTRAMUSCULAR; INTRAVENOUS; SUBCUTANEOUS at 19:29

## 2021-11-14 RX ADMIN — SODIUM CHLORIDE 1000 MILLILITER(S): 9 INJECTION INTRAMUSCULAR; INTRAVENOUS; SUBCUTANEOUS at 21:22

## 2021-11-14 RX ADMIN — Medication 30 MILLIGRAM(S): at 19:55

## 2021-11-14 RX ADMIN — Medication 1 PATCH: at 21:57

## 2021-11-14 NOTE — ED STATDOCS - GASTROINTESTINAL, MLM
abdomen soft, non-tender, and non-distended. Bowel sounds present. abdomen soft and non-distended. Bowel sounds present. +tender RUQ, +suprapubic area tenderness

## 2021-11-14 NOTE — ED STATDOCS - OBJECTIVE STATEMENT
42 y/o male with PMHx of HTN presents to ED for upper abdominal pain radiating into back since yesterday. Pt states pain comes on after eating, constant since yesterday. Went to urgent care 10AM, was given omeprazole without relief. Also took tylenol without relief. Has had similar sx in past which resolve on its own, has not been evaluated for sx yet. States pain is 7/10, denies N/V/D. +Smoker, pack a day. Occasional EtOH use. No hx of abdominal surgeries. 40 y/o male with PMHx of HTN (not on meds), drug abuse presents to ED for upper abdominal pain radiating into back since yesterday. Pt states pain comes on after eating, constant since yesterday. Went to urgent care 10AM, was given omeprazole without relief. Also took Tylenol without relief. Has had similar sx in past which were associated with n/v and resolved quickly on its own, has not been evaluated for sx yet. States pain is 7/10, denies N/V/D this time. +Smoker, pack a day. Occasional EtOH use. No hx of abdominal surgeries.

## 2021-11-14 NOTE — ED STATDOCS - PROGRESS NOTE DETAILS
42 y/o M with PMH of HTN, substance abuse presents with epigastric pain radiating around right lower chest intermittently x 1 week. Pt states pain is worse after eating. Went to outside urgent care this AM, Rx'd omeprazole noting no relief. No change in symptoms with tylenol. Denies fever, chills, nausea, vomiting, diarrhea. PE: Well appearing. Cardiac: s1s2, RRR. Lungs: CTAB. Abdomen: NBS x4, soft, +epigastric and RUQ tenderness. No CVAT. A/P: r/o pancreatitis, cholecystitis. Plan for labs, IVF, analgesia, CT, reassess. - Miller Pizano PA-C Pt states feels better after toradol. Explained results to pt and dad. Soke to surgical resident who request u/s. Discussed plan with pt and dad. El THOMAS

## 2021-11-14 NOTE — ED ADULT NURSE NOTE - OBJECTIVE STATEMENT
Patient presents due to right sided upper abdominal pain radiating to the back that has been on and off for a few days. Pt denies any abdominal surgeries. Pt denies n/v/d at this time.

## 2021-11-14 NOTE — ED ADULT NURSE NOTE - PAIN: BODY LOCATION
Last seen 02/09/2021  Next appointment 06/23/2021    Received prescription renewal request for buPROPion (WELLBUTRIN SR) 100 MG 12 hr tablet  Verified dosage(s) against 03/16/2021  Last renewed on 03/16/2021 for 30 day supply with 1 refill(s).    Renewing Rx for 30 day supply w/0 RF         
abdomen and back/upper/Right:

## 2021-11-14 NOTE — H&P ADULT - HISTORY OF PRESENT ILLNESS
40yo M w/ HTN (not on meds), smoker and morbid obesity presents w/ RUQ pain. Patient endorses that the pain started 4 months ago, intermittently but has progressively worsened. Last night 11PM, patient started having severe pain that patient could not even sleep. Patient visited urgent care and was recommended to visit ED for further imaging. Patient states that the pain radiates to the back. Patient is not tolerating pain due to nausea, but never vomited. Having regular bowel movement and passing flatus. Denies fever/chills, shortness of breath, chest pain. vS reviewed    PMH: HTN, morbid obesity  PSH: denies  All: NKDA

## 2021-11-14 NOTE — ED STATDOCS - CADM POA CENTRAL LINE
Spiritual Plan of Care    Pt Name: Scott Mckeon Sr.  Pt : 1970  Date: 2018    Referral source: Trigger    Reason for visit: Spiritual/Mandaeism/Ritual Support, Emotional Support    Visited with: Patient    Patient Assessment: Happy, Hopeful, Relies on devon, Coping , Support system    Patient  Intervention: Empathic Listening, Emotional Support, Affirmation, Prayer    Spiritual Plan of Care: Routine follow up    Patient Reported Outcome:  Increased sense of hope    Trigger. Pt affect alert, expressive, open, pleasant. \"I have been waiting for days to tell you my story!\" Pt shared how he had decided upon hospice and went home. His family was not ready for him to die and neither was he. \"It was divine intervention that I stopped it before anything happened because if they had moved things in, my insurance would not have paid for my return to [facility] and my immunotherapy.\" He is thankful his oncologist arranged for the therapy. Pt's outlook, emotions, and comfort level dramatically changed since Friday when writer last saw him. He is determined to fight and have more time. Pt is happy, hopeful, and coping. He is looking forward to his treatment on Friday. He has a good support system and relies on his devon. Writer listened empathically, provided emotional support, affirmed, and prayed. Pt received an increased sense of hope. Routine follow up.   No

## 2021-11-14 NOTE — H&P ADULT - NSHPLABSRESULTS_GEN_ALL_CORE
< from: US Abdomen Upper Quadrant Right (11.14.21 @ 21:46) >    IMPRESSION:    Sonographic findings equivocal for acute cholecystitis. If there is clinical suspicion for acute cholecystitis, a hepatobiliary scan may be obtained for further evaluation.    < end of copied text >    < from: CT Abdomen and Pelvis w/ IV Cont (11.14.21 @ 20:37) >    IMPRESSION:    Findings are concerning for acute calculous cholecystitis, may be confirmed with ultrasound and/or nuclear medicine HIDA scan.      < end of copied text >

## 2021-11-14 NOTE — H&P ADULT - NSHPPHYSICALEXAM_GEN_ALL_CORE
PHYSICAL EXAM:  GENERAL: NAD, AOx3, well developed, morbidly obese  HEAD:  Atraumatic, Normocephalic  EYES: EOMI, PERRLA, conjunctiva and sclera clear  ENT: Moist mucous membranes  NECK: Supple, No JVD  CHEST/LUNG: Clear to auscultation bilaterally; No rales, rhonchi, wheezing, or rubs. Unlabored respirations  HEART: Regular rate and rhythm; No murmurs, rubs, or gallops  ABDOMEN: soft, nondistended, tender to palpation at RUQ. Yen's sign positive  EXTREMITIES:  2+ Peripheral Pulses, brisk capillary refill. No clubbing, cyanosis, or edema  NERVOUS SYSTEM:  Alert & Oriented X3, speech clear. No deficits   MSK: full ROM, no deformities  SKIN: warm to touch. No rashes or lesions

## 2021-11-14 NOTE — ED ADULT TRIAGE NOTE - CHIEF COMPLAINT QUOTE
Pt c/o abd pain. pain started today and radiates to back. Pt went to clinic earlier today and was given "something" but did not help with the pain. Denies NVD, chest pain, SOB

## 2021-11-15 ENCOUNTER — RESULT REVIEW (OUTPATIENT)
Age: 41
End: 2021-11-15

## 2021-11-15 LAB
ALBUMIN SERPL ELPH-MCNC: 2.6 G/DL — LOW (ref 3.3–5)
ALP SERPL-CCNC: 71 U/L — SIGNIFICANT CHANGE UP (ref 40–120)
ALT FLD-CCNC: 32 U/L — SIGNIFICANT CHANGE UP (ref 12–78)
ANION GAP SERPL CALC-SCNC: 7 MMOL/L — SIGNIFICANT CHANGE UP (ref 5–17)
AST SERPL-CCNC: 12 U/L — LOW (ref 15–37)
BILIRUB SERPL-MCNC: 0.7 MG/DL — SIGNIFICANT CHANGE UP (ref 0.2–1.2)
BUN SERPL-MCNC: 10 MG/DL — SIGNIFICANT CHANGE UP (ref 7–23)
CALCIUM SERPL-MCNC: 8.6 MG/DL — SIGNIFICANT CHANGE UP (ref 8.5–10.1)
CHLORIDE SERPL-SCNC: 106 MMOL/L — SIGNIFICANT CHANGE UP (ref 96–108)
CO2 SERPL-SCNC: 24 MMOL/L — SIGNIFICANT CHANGE UP (ref 22–31)
COVID-19 NUCLEOCAPSID GAM AB INTERP: NEGATIVE — SIGNIFICANT CHANGE UP
COVID-19 NUCLEOCAPSID TOTAL GAM ANTIBODY RESULT: 0.11 INDEX — SIGNIFICANT CHANGE UP
COVID-19 SPIKE DOMAIN AB INTERP: POSITIVE
COVID-19 SPIKE DOMAIN ANTIBODY RESULT: 154 U/ML — HIGH
CREAT SERPL-MCNC: 1.03 MG/DL — SIGNIFICANT CHANGE UP (ref 0.5–1.3)
GLUCOSE SERPL-MCNC: 104 MG/DL — HIGH (ref 70–99)
HCT VFR BLD CALC: 40.1 % — SIGNIFICANT CHANGE UP (ref 39–50)
HGB BLD-MCNC: 13.7 G/DL — SIGNIFICANT CHANGE UP (ref 13–17)
MAGNESIUM SERPL-MCNC: 2 MG/DL — SIGNIFICANT CHANGE UP (ref 1.6–2.6)
MCHC RBC-ENTMCNC: 30.9 PG — SIGNIFICANT CHANGE UP (ref 27–34)
MCHC RBC-ENTMCNC: 34.2 GM/DL — SIGNIFICANT CHANGE UP (ref 32–36)
MCV RBC AUTO: 90.3 FL — SIGNIFICANT CHANGE UP (ref 80–100)
PHOSPHATE SERPL-MCNC: 2.6 MG/DL — SIGNIFICANT CHANGE UP (ref 2.5–4.5)
PLATELET # BLD AUTO: 271 K/UL — SIGNIFICANT CHANGE UP (ref 150–400)
POTASSIUM SERPL-MCNC: 3.7 MMOL/L — SIGNIFICANT CHANGE UP (ref 3.5–5.3)
POTASSIUM SERPL-SCNC: 3.7 MMOL/L — SIGNIFICANT CHANGE UP (ref 3.5–5.3)
PROT SERPL-MCNC: 6.4 GM/DL — SIGNIFICANT CHANGE UP (ref 6–8.3)
RBC # BLD: 4.44 M/UL — SIGNIFICANT CHANGE UP (ref 4.2–5.8)
RBC # FLD: 13.7 % — SIGNIFICANT CHANGE UP (ref 10.3–14.5)
SARS-COV-2 IGG+IGM SERPL QL IA: 0.11 INDEX — SIGNIFICANT CHANGE UP
SARS-COV-2 IGG+IGM SERPL QL IA: 154 U/ML — HIGH
SARS-COV-2 IGG+IGM SERPL QL IA: NEGATIVE — SIGNIFICANT CHANGE UP
SARS-COV-2 IGG+IGM SERPL QL IA: POSITIVE
SARS-COV-2 RNA SPEC QL NAA+PROBE: SIGNIFICANT CHANGE UP
SODIUM SERPL-SCNC: 137 MMOL/L — SIGNIFICANT CHANGE UP (ref 135–145)
WBC # BLD: 9.66 K/UL — SIGNIFICANT CHANGE UP (ref 3.8–10.5)
WBC # FLD AUTO: 9.66 K/UL — SIGNIFICANT CHANGE UP (ref 3.8–10.5)

## 2021-11-15 PROCEDURE — 88304 TISSUE EXAM BY PATHOLOGIST: CPT | Mod: 26

## 2021-11-15 RX ORDER — SODIUM CHLORIDE 9 MG/ML
1000 INJECTION, SOLUTION INTRAVENOUS
Refills: 0 | Status: DISCONTINUED | OUTPATIENT
Start: 2021-11-15 | End: 2021-11-15

## 2021-11-15 RX ORDER — OXYCODONE HYDROCHLORIDE 5 MG/1
10 TABLET ORAL ONCE
Refills: 0 | Status: DISCONTINUED | OUTPATIENT
Start: 2021-11-15 | End: 2021-11-15

## 2021-11-15 RX ORDER — ONDANSETRON 8 MG/1
4 TABLET, FILM COATED ORAL ONCE
Refills: 0 | Status: DISCONTINUED | OUTPATIENT
Start: 2021-11-15 | End: 2021-11-15

## 2021-11-15 RX ORDER — FENTANYL CITRATE 50 UG/ML
50 INJECTION INTRAVENOUS
Refills: 0 | Status: DISCONTINUED | OUTPATIENT
Start: 2021-11-15 | End: 2021-11-15

## 2021-11-15 RX ORDER — INFLUENZA VIRUS VACCINE 15; 15; 15; 15 UG/.5ML; UG/.5ML; UG/.5ML; UG/.5ML
0.5 SUSPENSION INTRAMUSCULAR ONCE
Refills: 0 | Status: COMPLETED | OUTPATIENT
Start: 2021-11-15 | End: 2021-11-15

## 2021-11-15 RX ORDER — ACETAMINOPHEN 500 MG
1000 TABLET ORAL ONCE
Refills: 0 | Status: COMPLETED | OUTPATIENT
Start: 2021-11-15 | End: 2021-11-16

## 2021-11-15 RX ADMIN — Medication 1 PATCH: at 10:31

## 2021-11-15 RX ADMIN — Medication 15 MILLIGRAM(S): at 21:46

## 2021-11-15 RX ADMIN — Medication 1 PATCH: at 19:35

## 2021-11-15 RX ADMIN — HEPARIN SODIUM 5000 UNIT(S): 5000 INJECTION INTRAVENOUS; SUBCUTANEOUS at 21:47

## 2021-11-15 RX ADMIN — Medication 15 MILLIGRAM(S): at 22:16

## 2021-11-15 RX ADMIN — Medication 200 MILLIGRAM(S): at 18:59

## 2021-11-15 RX ADMIN — Medication 1 PATCH: at 06:06

## 2021-11-15 RX ADMIN — Medication 100 MILLIGRAM(S): at 15:18

## 2021-11-15 RX ADMIN — OXYCODONE HYDROCHLORIDE 10 MILLIGRAM(S): 5 TABLET ORAL at 14:31

## 2021-11-15 RX ADMIN — OXYCODONE HYDROCHLORIDE 10 MILLIGRAM(S): 5 TABLET ORAL at 14:32

## 2021-11-15 RX ADMIN — Medication 100 MILLIGRAM(S): at 04:34

## 2021-11-15 RX ADMIN — Medication 200 MILLIGRAM(S): at 06:05

## 2021-11-15 RX ADMIN — Medication 1 PATCH: at 15:53

## 2021-11-15 NOTE — CHART NOTE - NSCHARTNOTEFT_GEN_A_CORE
SURGERY DATE: 11/15/21    PREOP DIAGNOSIS: Acute Cholecystitis    POSTOP DIAGNOSIS:  Same    PROCEDURE:  Laparoscopic Cholecystectomy, TAP Block    SURGEON: Keo Machuca MD    ASSISTANT: Dr. Zimmerman    ANESTHESIA: GETA    EBL: 30cc    SPECIMEN:  Gallbladder and stones sent to pathology    DRAINS: There were no drains.    COMPLICATIONS : none    INDICATIONS FOR THE PROCEDURE: The patient is a 41-year-old male with a right upper quadrant pain. The right upper quadrant pain  began 5 days ago, but has been on and off for approximately 5 months. It was a very sharp pain radiating to the right shoulder. The patient was nauseous, but was not vomiting. The patient had an ultrasound which showed that she had acute cholecystitis and she had a CT scan of the abdomen which showed no definitive findings, but the ultrasound of her gallbladder showed gallstones. The patient was taken to the operating room for laparoscopic cholecystectomy.    DESCRIPTION OF THE PROCEDURE:  The patient was identified properly via timeout.  The patient was brought into the operating room and was placed onto the operating room table in supine position.  The patient was then given general endotracheal anesthesia and was given preoperative antibiotics.  The patient was then prepped and draped in the usual sterile fashion.  An Exparel mixture was mixed at the back table with 20 mL of Exparel, 30 mL of 0.25% Marcaine and 150 mL of normal saline.  A Veress needle was placed in left upper quadrant.  The abdomen was insufflated to 15 mmHg.  Once the abdomen was insufflated, the Exparel mixture was given subcutaneously at the sites of incision.  An incision was made within the umbilicus and a 5 mm trocar was placed in the abdomen.  The laparoscope was inserted and there was no injury on initial trocar placement or initial Veress needle placement.  A transversus abdominus plane Block was then conducted by placing 20 mL of the Exparel mixture preperitoneal at the distribution of the spinal nerves on the lateral abdominal wall.  This was started at the costal margin at the mid axillary line.  20 mL of the Exparel mixture was placed in this area.  Another 20 mL was placed 4 cm caudal to this area.  Another 20 cm was placed 4 cm caudal to this area and another 15 mL was placed 4 cm caudal to this area.  This was repeated on the opposite side of the body in a similar fashion.  The rest of the Exparel was placed into the subcutaneous tissues and preperitoneal space at every trocar site.  Two 5 mm trochars were placed into the right upper quadrant.  A 12 mm trocar was placed into the left upper quadrant.  The gallbladder was then visualized.  The gallbladder was then brought over the liver using a grasper and the infundibulum was grasped and retracted towards the appendix.  This maneuver exposed Calot's triangle.  The cystic artery and cystic duct were identified.  They were circumferentially dissected and the critical view was obtained.  The cystic duct was then triply clipped using the clip applier and the cystic duct was divided close to the gallbladder.  The cystic duct was then secured closed with and Endoloop. The cystic artery was then taken with the LigaSure device.  The gallbladder was then removed from the gallbladder wall fossa using electrocautery.  Hemostasis was obtained.  The gallbladder was then placed into an Endo catch bag and removed from the 12 mm trocar.  The abdomen was irrigated and suctioned.  Hemostasis was achieved.  The trochars were then removed, 12mm left upper quadrant port was closed with a 0-PDS suture, and the skin was closed with 4-0 Monocryl.  Dermabond was applied over that.  The patient tolerated the procedure well and was brought to the recovery room in stable condition.

## 2021-11-15 NOTE — BRIEF OPERATIVE NOTE - NSICDXBRIEFPREOP_GEN_ALL_CORE_FT
PRE-OP DIAGNOSIS:  Gallbladder calculus with acute cholecystitis 15-Nov-2021 14:01:30  Serafin Moreno

## 2021-11-15 NOTE — ED ADULT NURSE REASSESSMENT NOTE - GENERAL PATIENT STATE
rates current pain level 3/10; awaiting transport to /comfortable appearance/cooperative/improvement verbalized

## 2021-11-15 NOTE — PROGRESS NOTE ADULT - ASSESSMENT
40yo M presents w/ acute cholecystitis  US/CT demonstrated gallstones, wall thickening and trace pericholecystic fluid compatible with acute cholecystitis  LFT normal    Plan:  - pain control prn  - monitor VS  - diet: NPO  - antiemetic control prn  - continue IVF  - continue IV ABx  - DVT PPX  - encourage IS/OOB  - planning for lap cholecystectomy in AM    Plan discussed with surgery team and attending, Dr. De La Rosa

## 2021-11-15 NOTE — PROGRESS NOTE ADULT - SUBJECTIVE AND OBJECTIVE BOX
SURGERY DAILY PROGRESS NOTE:     Subjective:  Patient seen and examined this AM at bedside. No acute events overnight and patient resting comfortably. abdominal pain improved with pain medications but still has pain at RUQ. Denies n/v. Denies fever/chills, shortness of breath, chest pain. VS reviewed    Objective:    MEDICATIONS  (STANDING):  ciprofloxacin   IVPB 400 milliGRAM(s) IV Intermittent every 12 hours  heparin   Injectable 5000 Unit(s) SubCutaneous every 8 hours  influenza   Vaccine 0.5 milliLiter(s) IntraMuscular once  lactated ringers. 1000 milliLiter(s) (120 mL/Hr) IV Continuous <Continuous>  metroNIDAZOLE  IVPB 500 milliGRAM(s) IV Intermittent every 8 hours  nicotine - 21 mG/24Hr(s) Patch 1 patch Transdermal daily    MEDICATIONS  (PRN):  acetaminophen   IVPB .. 1000 milliGRAM(s) IV Intermittent every 6 hours PRN Mild Pain (1 - 3)  ketorolac   Injectable 15 milliGRAM(s) IV Push every 6 hours PRN Moderate Pain (4 - 6)  morphine  - Injectable 2 milliGRAM(s) IV Push every 6 hours PRN Severe Pain (7 - 10)  ondansetron Injectable 4 milliGRAM(s) IV Push every 6 hours PRN Nausea      Vital Signs Last 24 Hrs  T(C): 36.8 (15 Nov 2021 01:25), Max: 37.1 (2021 18:43)  T(F): 98.3 (15 Nov 2021 01:25), Max: 98.8 (2021 18:43)  HR: 88 (15 Nov 2021 01:) (84 - 94)  BP: 153/91 (15 Nov 2021 01:25) (153/91 - 185/114)  BP(mean): 105 (15 Nov 2021 01:25) (105 - 105)  RR: 16 (15 Nov 2021 01:) (16 - 18)  SpO2: 98% (15 Nov 2021 01:) (98% - 98%)      PHYSICAL EXAM   GENERAL: NAD, AOx3, well developed, morbidly obese  HEAD:  Atraumatic, Normocephalic  EYES: EOMI, PERRLA, conjunctiva and sclera clear  ENT: Moist mucous membranes  NECK: Supple, No JVD  CHEST/LUNG: Clear to auscultation bilaterally; No rales, rhonchi, wheezing, or rubs. Unlabored respirations  HEART: Regular rate and rhythm; No murmurs, rubs, or gallops  ABDOMEN: soft, nondistended, tender to palpation at RUQ  EXTREMITIES:  2+ Peripheral Pulses, brisk capillary refill. No clubbing, cyanosis, or edema  NERVOUS SYSTEM:  Alert & Oriented X3, speech clear. No deficits   MSK: full ROM, no deformities  SKIN: warm to touch. No rashes or lesions      I&O's Detail      Daily Height in cm: 177.8 (2021 18:28)    Daily     LABS:                        15.9   14.36 )-----------( 352      ( 2021 19:23 )             46.2     11-14    136  |  103  |  10  ----------------------------<  114<H>  3.6   |  26  |  1.13    Ca    9.3      2021 19:23    TPro  7.7  /  Alb  3.2<L>  /  TBili  0.6  /  DBili  x   /  AST  16  /  ALT  42  /  AlkPhos  87  11-14    PT/INR - ( 2021 19:23 )   PT: 12.9 sec;   INR: 1.11 ratio         PTT - ( 2021 19:23 )  PTT:34.3 sec  Urinalysis Basic - ( 2021 20:46 )    Color: Yellow / Appearance: Clear / S.010 / pH: x  Gluc: x / Ketone: Negative  / Bili: Negative / Urobili: Negative mg/dL   Blood: x / Protein: 30 mg/dL / Nitrite: Negative   Leuk Esterase: Negative / RBC: 0-2 /HPF / WBC 0-2   Sq Epi: x / Non Sq Epi: Occasional / Bacteria: Occasional        RADIOLOGY & ADDITIONAL STUDIES:

## 2021-11-15 NOTE — BRIEF OPERATIVE NOTE - NSICDXBRIEFPOSTOP_GEN_ALL_CORE_FT
POST-OP DIAGNOSIS:  Calculus of gallbladder with acute on chronic cholecystitis 15-Nov-2021 14:02:22  Serafin Moreno

## 2021-11-16 ENCOUNTER — TRANSCRIPTION ENCOUNTER (OUTPATIENT)
Age: 41
End: 2021-11-16

## 2021-11-16 VITALS
TEMPERATURE: 98 F | OXYGEN SATURATION: 97 % | DIASTOLIC BLOOD PRESSURE: 80 MMHG | HEART RATE: 71 BPM | RESPIRATION RATE: 18 BRPM | SYSTOLIC BLOOD PRESSURE: 127 MMHG

## 2021-11-16 LAB
ALBUMIN SERPL ELPH-MCNC: 2.5 G/DL — LOW (ref 3.3–5)
ALP SERPL-CCNC: 66 U/L — SIGNIFICANT CHANGE UP (ref 40–120)
ALT FLD-CCNC: 86 U/L — HIGH (ref 12–78)
ANION GAP SERPL CALC-SCNC: 9 MMOL/L — SIGNIFICANT CHANGE UP (ref 5–17)
AST SERPL-CCNC: 69 U/L — HIGH (ref 15–37)
BASOPHILS # BLD AUTO: 0.04 K/UL — SIGNIFICANT CHANGE UP (ref 0–0.2)
BASOPHILS NFR BLD AUTO: 0.2 % — SIGNIFICANT CHANGE UP (ref 0–2)
BILIRUB SERPL-MCNC: 0.4 MG/DL — SIGNIFICANT CHANGE UP (ref 0.2–1.2)
BUN SERPL-MCNC: 10 MG/DL — SIGNIFICANT CHANGE UP (ref 7–23)
CALCIUM SERPL-MCNC: 8.3 MG/DL — LOW (ref 8.5–10.1)
CHLORIDE SERPL-SCNC: 107 MMOL/L — SIGNIFICANT CHANGE UP (ref 96–108)
CO2 SERPL-SCNC: 23 MMOL/L — SIGNIFICANT CHANGE UP (ref 22–31)
CREAT SERPL-MCNC: 1.04 MG/DL — SIGNIFICANT CHANGE UP (ref 0.5–1.3)
EOSINOPHIL # BLD AUTO: 0.01 K/UL — SIGNIFICANT CHANGE UP (ref 0–0.5)
EOSINOPHIL NFR BLD AUTO: 0.1 % — SIGNIFICANT CHANGE UP (ref 0–6)
GLUCOSE SERPL-MCNC: 109 MG/DL — HIGH (ref 70–99)
HCT VFR BLD CALC: 38.8 % — LOW (ref 39–50)
HGB BLD-MCNC: 13 G/DL — SIGNIFICANT CHANGE UP (ref 13–17)
IMM GRANULOCYTES NFR BLD AUTO: 0.5 % — SIGNIFICANT CHANGE UP (ref 0–1.5)
LYMPHOCYTES # BLD AUTO: 15.6 % — SIGNIFICANT CHANGE UP (ref 13–44)
LYMPHOCYTES # BLD AUTO: 2.58 K/UL — SIGNIFICANT CHANGE UP (ref 1–3.3)
MAGNESIUM SERPL-MCNC: 2.1 MG/DL — SIGNIFICANT CHANGE UP (ref 1.6–2.6)
MCHC RBC-ENTMCNC: 30.6 PG — SIGNIFICANT CHANGE UP (ref 27–34)
MCHC RBC-ENTMCNC: 33.5 GM/DL — SIGNIFICANT CHANGE UP (ref 32–36)
MCV RBC AUTO: 91.3 FL — SIGNIFICANT CHANGE UP (ref 80–100)
MONOCYTES # BLD AUTO: 1.29 K/UL — HIGH (ref 0–0.9)
MONOCYTES NFR BLD AUTO: 7.8 % — SIGNIFICANT CHANGE UP (ref 2–14)
NEUTROPHILS # BLD AUTO: 12.55 K/UL — HIGH (ref 1.8–7.4)
NEUTROPHILS NFR BLD AUTO: 75.8 % — SIGNIFICANT CHANGE UP (ref 43–77)
PHOSPHATE SERPL-MCNC: 3.3 MG/DL — SIGNIFICANT CHANGE UP (ref 2.5–4.5)
PLATELET # BLD AUTO: 312 K/UL — SIGNIFICANT CHANGE UP (ref 150–400)
POTASSIUM SERPL-MCNC: 4.1 MMOL/L — SIGNIFICANT CHANGE UP (ref 3.5–5.3)
POTASSIUM SERPL-SCNC: 4.1 MMOL/L — SIGNIFICANT CHANGE UP (ref 3.5–5.3)
PROT SERPL-MCNC: 6.3 GM/DL — SIGNIFICANT CHANGE UP (ref 6–8.3)
RBC # BLD: 4.25 M/UL — SIGNIFICANT CHANGE UP (ref 4.2–5.8)
RBC # FLD: 13.3 % — SIGNIFICANT CHANGE UP (ref 10.3–14.5)
SODIUM SERPL-SCNC: 139 MMOL/L — SIGNIFICANT CHANGE UP (ref 135–145)
WBC # BLD: 16.56 K/UL — HIGH (ref 3.8–10.5)
WBC # FLD AUTO: 16.56 K/UL — HIGH (ref 3.8–10.5)

## 2021-11-16 RX ORDER — ACETAMINOPHEN 500 MG
2 TABLET ORAL
Qty: 60 | Refills: 0
Start: 2021-11-16 | End: 2021-11-25

## 2021-11-16 RX ORDER — IBUPROFEN 200 MG
1 TABLET ORAL
Qty: 30 | Refills: 0
Start: 2021-11-16 | End: 2021-11-25

## 2021-11-16 RX ADMIN — Medication 100 MILLIGRAM(S): at 06:42

## 2021-11-16 RX ADMIN — Medication 400 MILLIGRAM(S): at 00:19

## 2021-11-16 RX ADMIN — HEPARIN SODIUM 5000 UNIT(S): 5000 INJECTION INTRAVENOUS; SUBCUTANEOUS at 06:41

## 2021-11-16 RX ADMIN — Medication 1000 MILLIGRAM(S): at 00:45

## 2021-11-16 RX ADMIN — Medication 200 MILLIGRAM(S): at 05:35

## 2021-11-16 NOTE — DISCHARGE NOTE PROVIDER - NSDCMRMEDTOKEN_GEN_ALL_CORE_FT
acetaminophen 500 mg oral capsule: 2 cap(s) orally every 8 hours   Alivio 600 mg oral tablet: 1 tab(s) orally every 8 hours

## 2021-11-16 NOTE — DISCHARGE NOTE PROVIDER - HOSPITAL COURSE
40yo M w/ HTN (not on meds), smoker and morbid obesity presents w/ RUQ pain. Patient endorses that the pain started 4 months ago, intermittently but has progressively worsened. Last night 11PM, patient started having severe pain that patient could not even sleep. Patient visited urgent care and was recommended to visit ED for further imaging. Patient states that the pain radiates to the back. Patient is not tolerating pain due to nausea, but never vomited. Having regular bowel movement and passing flatus. Denies fever/chills, shortness of breath, chest pain. Pt had US/CT demonstrated gallstones, wall thickening and trace pericholecystic fluid compatible with acute cholecystitis. Pt was taken to OR and had Laparoscopic cholecystectomy. Post-operative course uneventful. pt is tolerating diet and pain and ready to be discharged to follow up in the Office x 2 weeks.

## 2021-11-16 NOTE — DISCHARGE NOTE NURSING/CASE MANAGEMENT/SOCIAL WORK - NSDCPEEMAIL_GEN_ALL_CORE
[facility] for Tobacco Control email [email or web address]

## 2021-11-16 NOTE — DISCHARGE NOTE NURSING/CASE MANAGEMENT/SOCIAL WORK - NSDCPEWEB_GEN_ALL_CORE
[facility] for Tobacco Control website --- [email or web address] website --- [email or web address]

## 2021-11-16 NOTE — DISCHARGE NOTE PROVIDER - CARE PROVIDER_API CALL
Keo Machuca (MD)  Surgery  [address], [address]  [place] [address]  Phone: [phone]  Fax: [phone]  Follow Up Time: 2 weeks

## 2021-11-16 NOTE — DISCHARGE NOTE NURSING/CASE MANAGEMENT/SOCIAL WORK - PATIENT PORTAL LINK FT
You can access the FollowMyHealth Patient Portal offered by [facility] by registering at the following website: [email or web address]. By joining [facility]’s FollowMyHealth portal, you will also be able to view your health information using other applications (apps) compatible with our system.

## 2021-11-19 DIAGNOSIS — I10 ESSENTIAL (PRIMARY) HYPERTENSION: ICD-10-CM

## 2021-11-19 DIAGNOSIS — K80.00 CALCULUS OF GALLBLADDER WITH ACUTE CHOLECYSTITIS WITHOUT OBSTRUCTION: ICD-10-CM

## 2021-11-19 DIAGNOSIS — F17.200 NICOTINE DEPENDENCE, UNSPECIFIED, UNCOMPLICATED: ICD-10-CM

## 2021-11-19 DIAGNOSIS — F19.19 OTHER PSYCHOACTIVE SUBSTANCE ABUSE WITH UNSPECIFIED PSYCHOACTIVE SUBSTANCE-INDUCED DISORDER: ICD-10-CM

## 2021-11-19 DIAGNOSIS — E66.01 MORBID (SEVERE) OBESITY DUE TO EXCESS CALORIES: ICD-10-CM

## 2023-10-31 ENCOUNTER — NON-APPOINTMENT (OUTPATIENT)
Age: 43
End: 2023-10-31

## 2023-11-07 ENCOUNTER — APPOINTMENT (OUTPATIENT)
Dept: ORTHOPEDIC SURGERY | Facility: CLINIC | Age: 43
End: 2023-11-07
Payer: MEDICAID

## 2023-11-07 ENCOUNTER — NON-APPOINTMENT (OUTPATIENT)
Age: 43
End: 2023-11-07

## 2023-11-07 DIAGNOSIS — M65.312 TRIGGER THUMB, LEFT THUMB: ICD-10-CM

## 2023-11-07 PROCEDURE — 99204 OFFICE O/P NEW MOD 45 MIN: CPT | Mod: 25

## 2023-11-07 PROCEDURE — 20550 NJX 1 TENDON SHEATH/LIGAMENT: CPT | Mod: LT

## 2025-07-01 ENCOUNTER — NON-APPOINTMENT (OUTPATIENT)
Age: 45
End: 2025-07-01